# Patient Record
Sex: MALE | Race: WHITE | NOT HISPANIC OR LATINO | Employment: FULL TIME | URBAN - METROPOLITAN AREA
[De-identification: names, ages, dates, MRNs, and addresses within clinical notes are randomized per-mention and may not be internally consistent; named-entity substitution may affect disease eponyms.]

---

## 2024-09-17 ENCOUNTER — HOSPITAL ENCOUNTER (OUTPATIENT)
Dept: RADIOLOGY | Facility: CLINIC | Age: 64
Discharge: HOME | End: 2024-09-17
Payer: COMMERCIAL

## 2024-09-17 ENCOUNTER — OFFICE VISIT (OUTPATIENT)
Dept: PODIATRY | Facility: CLINIC | Age: 64
End: 2024-09-17
Payer: COMMERCIAL

## 2024-09-17 DIAGNOSIS — M20.5X2 HALLUX LIMITUS OF LEFT FOOT: ICD-10-CM

## 2024-09-17 DIAGNOSIS — G57.92 NEURITIS OF LEFT FOOT: ICD-10-CM

## 2024-09-17 DIAGNOSIS — I83.11 VARICOSE VEINS OF BOTH LOWER EXTREMITIES WITH INFLAMMATION: Primary | ICD-10-CM

## 2024-09-17 DIAGNOSIS — I83.12 VARICOSE VEINS OF BOTH LOWER EXTREMITIES WITH INFLAMMATION: Primary | ICD-10-CM

## 2024-09-17 DIAGNOSIS — R25.2 MUSCLE CRAMP, NOCTURNAL: ICD-10-CM

## 2024-09-17 PROCEDURE — 1036F TOBACCO NON-USER: CPT | Performed by: PODIATRIST

## 2024-09-17 PROCEDURE — 99203 OFFICE O/P NEW LOW 30 MIN: CPT | Performed by: PODIATRIST

## 2024-09-17 PROCEDURE — 73630 X-RAY EXAM OF FOOT: CPT | Mod: LT

## 2024-09-17 PROCEDURE — 1160F RVW MEDS BY RX/DR IN RCRD: CPT | Performed by: PODIATRIST

## 2024-09-17 PROCEDURE — 99213 OFFICE O/P EST LOW 20 MIN: CPT | Performed by: PODIATRIST

## 2024-09-17 PROCEDURE — 73630 X-RAY EXAM OF FOOT: CPT | Mod: LEFT SIDE | Performed by: RADIOLOGY

## 2024-09-17 PROCEDURE — 1159F MED LIST DOCD IN RCRD: CPT | Performed by: PODIATRIST

## 2024-09-17 NOTE — PROGRESS NOTES
"Chief Complaint   Patient presents with    Foot Pain     New patient is here today with numbness in left foot. Started years ago. Patient is a  and he uses the left foot for the clutch. No OTC medications or creama   HPI: C/O numbness in left foot.  Can't bend the 1st MPJ,  \"all the toes are numb\"  \"getting worse\".  No previous treatments. Numbness at night.  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  Patient denies any radiating pain coming from his lower back.  States that foot is currently tingling while sitting in the chair.  Patient complains of muscle cramping only in the left lower leg at nights.  Patient is concerned about color changes on his medial ankles bilaterally that he has noticed over the last 2 months.    PMH, PSx, Medications and allergies reviewed.  ROS negative except for what is stated in HPI.    Physical Exam  Patient alert, oriented, no acute distress  Respiratory: non labored breathing  Psychiatric: Mood and affect normal/baseline  HEENT: sclera clear    VASC: +2/4 pedal pulses B/L.  CFT brisk all digits.  Skin temperature is warm to warm proximal distal B/L.  (+)hair growth B/L.   Mild LE edema B/L and multiple varicosities.  Spider veins are noted on the medial ankles bilaterally.    NEURO: Vibratory intact B/L.  Light touch intact B/L.   5.07 [place] monofilament intact B/L.    DERM: No ecchymosis, no erythema, no ulcerations noted B/L.    MUSCULOSKEL: +5/5 muscle strength B/L.    Minimal range of motion noted 1st MPJ L with large dorsal exostosis, no pain upon palpation     Assessment and Plan  #1  Hallux limitus left foot  Discussed pathology and treatment options  Continue in supportive shoes  Rx: Topical combination cream as needed for pain  Rx: X-rays left foot  Follow-up as needed    #2 neuritis left lower extremity  Discussed in detail with patient  This is likely secondary to his lower back or hip  No nerve entrapments are noted on exam in the " left foot  Rx: Topical combination cream as needed  Recommend over-the-counter multivitamin with vitamin Bs and vitamin D  Patient to follow-up with orthopedics or chiropractic  Patient will follow-up with his chiropractor    #3 muscle cramping, nocturnal  Discussed pathology and treatment options  Patient to stay hydrated  Patient can drink Gatorade to replenish electrolytes  Discussed use of over-the-counter magnesium at bedtime  Follow-up as needed    #4  Varicose veins  Discussed pathology and treatment options  Patient can try an over-the-counter support stocking if he has increased edema throughout the day  Patient to elevate the legs at end of day  Follow-up as needed

## 2024-09-17 NOTE — PATIENT INSTRUCTIONS
#1  Hallux limitus left foot  Discussed pathology and treatment options  Continue in supportive shoes  Rx: Topical combination cream as needed for pain  Rx: X-rays left foot  Follow-up as needed    #2 neuritis left lower extremity  Discussed in detail with patient  This is likely secondary to his lower back or hip  No nerve entrapments are noted on exam in the left foot  Rx: Topical combination cream as needed  Recommend over-the-counter multivitamin with vitamin Bs and vitamin D  Patient to follow-up with orthopedics or chiropractic  Patient will follow-up with his chiropractor    #3 muscle cramping, nocturnal  Discussed pathology and treatment options  Patient to stay hydrated  Patient can drink Gatorade to replenish electrolytes  Discussed use of over-the-counter magnesium at bedtime  Follow-up as needed    #4  Varicose veins  Discussed pathology and treatment options  Patient can try an over-the-counter support stocking if he has increased edema throughout the day  Patient to elevate the legs at end of day  Follow-up as needed